# Patient Record
(demographics unavailable — no encounter records)

---

## 2024-10-16 NOTE — END OF VISIT
[FreeTextEntry3] :  I, FLORINA Wong , personally performed the evaluation and management (E/M) services for this established  patient. That E/M includes conducting the initial examination, assessing all conditions, and establishing the plan of care. Today, SANGITA GAN  was here to observe my evaluation and management services for this patient.

## 2024-10-16 NOTE — DISCUSSION/SUMMARY
[0] : 0 [Slow Progress] : is progressing slowly [Excellent Pain Control] : has excellent pain control [No Sign of Infection] : is showing no signs of infection [Coumadin] : the patient is not on Coumadin

## 2024-10-16 NOTE — ASSESSMENT
[FreeTextEntry1] : 61y F whom is Sinhala speaking primarily w/ a PMH of DM on oral medication now with CAD s/p C3L on 9/17/24  Post op skin yeast infection noted by wound care RN, started on antifungal cream, DC home on 5 days diuretics. Referred by Dr. Isadora Viveros, cath with Dr. Kalpesh Farnsworth.   Presents today with her son.  Doing well.  Saw Dr. Viveros   Gradually progressing since DC and walking more everyday.  Eating and drinking with +BM. Denies chest pain, SOB, swelling, weight gain, palpitations, cough, fever or chills. Rash much improved.  Sai Harrington (PCP)  Today on exam patient's lungs clear bilaterally, normal sinus rhythm, sternum stable, incision clean, dry and intact, small opening noted to lower pole of MSI  Left LE SVG site is clean, dry and intact. No peripheral edema noted. Instructed patient on importance of optimal glycemic control, daily showering, daily weights, incentive spirometer use, and increase ambulation as tolerated. Instructed to call office with any signs or symptoms of infection or weight gain of 2 or more pounds in 1 day or 3 or more pounds in 1 week.   Plan:  - Continue current medication regimen - Follow up with cardiologist Dr. Viveros - Follow up with PCP Sai Harrington - Continue to walk and increase as tolerated - Low salt diet and fluids discussed in detail - Tight glucose control discussed in detail for wound healing -  Advised to apply betadine to MSI daily and PRN  - Return to office in ONE weeks for recheck - Call with any questions or concerns

## 2024-10-23 NOTE — PHYSICAL EXAM
[] : no respiratory distress [Respiration, Rhythm And Depth] : normal respiratory rhythm and effort [Exaggerated Use Of Accessory Muscles For Inspiration] : no accessory muscle use [Auscultation Breath Sounds / Voice Sounds] : lungs were clear to auscultation bilaterally [Apical Impulse] : the apical impulse was normal [Heart Rate And Rhythm] : heart rate was normal and rhythm regular [Heart Sounds] : normal S1 and S2 [Clean] : clean [Dry] : dry [Healing Well] : healing well [No Edema] : no edema [FreeTextEntry1] : small opening noted to lower pole of incision  [Bleeding] : no active bleeding [Foul Odor] : no foul smell [Purulent Drainage] : no purulent drainage [Serosanguinous Drainage] : no serosanguinous drainage [Erythema] : not erythematous [Warm] : not warm [Tender] : not tender [FreeTextEntry5] : Left LE SVG

## 2024-10-23 NOTE — ASSESSMENT
[FreeTextEntry1] : 61y F whom is Icelandic speaking primarily w/ a PMH of DM on oral medication now with CAD s/p C3L on 9/17/24  Post op skin yeast infection noted by wound care RN, started on antifungal cream, DC home on 5 days diuretics. Referred by Dr. Isadora Viveros, cath with Dr. Kalpesh Farnsworth.   Presents today with her son.  Doing well.  Saw Dr. Viveros   Gradually progressing since DC and walking more everyday.  Eating and drinking with +BM. Denies chest pain, SOB, swelling, weight gain, palpitations, cough, fever or chills.   Sai Harrington (PCP)  Today on exam patient's lungs clear bilaterally, normal sinus rhythm, sternum stable, incision clean, dry and intact, small opening noted to lower pole of MSI  Left LE SVG site is clean, dry and intact. No peripheral edema noted. Instructed patient on importance of optimal glycemic control, daily showering, daily weights, incentive spirometer use, and increase ambulation as tolerated. Instructed to call office with any signs or symptoms of infection or weight gain of 2 or more pounds in 1 day or 3 or more pounds in 1 week.   Plan:  - Continue current medication regimen - Follow up with cardiologist Dr. Viveros - Follow up with PCP Sai Harrington - Continue to walk and increase as tolerated - Low salt diet and fluids discussed in detail - Tight glucose control discussed in detail for wound healing -  Advised to apply betadine to MSI daily and PRN  - Return to office in two weeks for recheck if needed  - Call with any questions or concerns

## 2024-10-23 NOTE — ASSESSMENT
[FreeTextEntry1] : 61y F whom is Arabic speaking primarily w/ a PMH of DM on oral medication now with CAD s/p C3L on 9/17/24  Post op skin yeast infection noted by wound care RN, started on antifungal cream, DC home on 5 days diuretics. Referred by Dr. Isadora Viveros, cath with Dr. Kalpesh Farnsworth.   Presents today with her son.  Doing well.  Saw Dr. Viveros   Gradually progressing since DC and walking more everyday.  Eating and drinking with +BM. Denies chest pain, SOB, swelling, weight gain, palpitations, cough, fever or chills.   Sai Harrington (PCP)  Today on exam patient's lungs clear bilaterally, normal sinus rhythm, sternum stable, incision clean, dry and intact, small opening noted to lower pole of MSI  Left LE SVG site is clean, dry and intact. No peripheral edema noted. Instructed patient on importance of optimal glycemic control, daily showering, daily weights, incentive spirometer use, and increase ambulation as tolerated. Instructed to call office with any signs or symptoms of infection or weight gain of 2 or more pounds in 1 day or 3 or more pounds in 1 week.   Plan:  - Continue current medication regimen - Follow up with cardiologist Dr. Viveros - Follow up with PCP Sai Harrington - Continue to walk and increase as tolerated - Low salt diet and fluids discussed in detail - Tight glucose control discussed in detail for wound healing -  Advised to apply betadine to MSI daily and PRN  - Return to office in two weeks for recheck if needed  - Call with any questions or concerns

## 2024-11-12 NOTE — CONSULT LETTER
[Dear  ___] : Dear  [unfilled], [Courtesy Letter:] : I had the pleasure of seeing your patient, [unfilled], in my office today. [Please see my note below.] : Please see my note below. [Consult Closing:] : Thank you very much for allowing me to participate in the care of this patient.  If you have any questions, please do not hesitate to contact me. [Sincerely,] : Sincerely, [FreeTextEntry3] : Ethan Farris MD [facility] Advanced Minimally Invasive, Robotic, General & Bariatric Surgery Chair, Dept of Surgery, [facility] Physician [address] [address] P: [phone] F: [phone]

## 2024-11-12 NOTE — CONSULT LETTER
[Dear  ___] : Dear  [unfilled], [Courtesy Letter:] : I had the pleasure of seeing your patient, [unfilled], in my office today. [Please see my note below.] : Please see my note below. [Consult Closing:] : Thank you very much for allowing me to participate in the care of this patient.  If you have any questions, please do not hesitate to contact me. [Sincerely,] : Sincerely, [FreeTextEntry3] : Ethan Farris MD [facility] Advanced Minimally Invasive, Robotic, General & Bariatric Surgery Chair, Dept of Surgery, [facility] Physician [address] [address] P: [phone] F: [phone]

## 2024-11-12 NOTE — PHYSICAL EXAM
[Normal Breath Sounds] : Normal breath sounds [Normal Heart Sounds] : normal heart sounds [Normal Rate and Rhythm] : normal rate and rhythm [No Rash or Lesion] : No rash or lesion [Alert] : alert [Oriented to Person] : oriented to person [Oriented to Place] : oriented to place [Oriented to Time] : oriented to time [Calm] : calm [de-identified] : Elderly appearing and obese woman in no acute distress [de-identified] : NCAT, PERRLA, EOMI.  No scleral icterus or jaundice [de-identified] : Well healed median sternotomy.  Scab at right sided precordial drain site no signs of infection [de-identified] : Soft, nontender nondistended, positive bowel sounds in all four quads.  No hernia or masses.  Surgical incisions remain well approximated and healing appropriately without erythema, induration or fluctuance. [de-identified] : Ambulating without difficulty or assistance

## 2024-11-12 NOTE — ASSESSMENT
[FreeTextEntry1] : s/p Lap Cholecystectomy. (recent CABG x3)  Uncomplicated post op course. Complains of loose stools.  Abdomen remains, Soft, nontender nondistended, positive bowel sounds in all four quads.  No hernia or masses.  Surgical incisions remain well approximated and healing appropriately without erythema, induration or fluctuance.  Path reviewed and discussed.  Acute cholecystitis and Cholelithiasis.  Benigns LN.  Multiple calculi ranging rom 0.1-0.5 cm aggregating at 4.5 x 4.5 x 0.5 present in lumen.  Postoperative instructions have been provided including avoidance of heavy lifting and strenuous activities in excess of 20-25 pounds for duration of 4-6 weeks. The patient may shower keeping the incisions clean, dry, covered as needed.  f/u PRN. No contraindication to resuming Cardiac Rehab given recent CABG.

## 2024-11-12 NOTE — HISTORY OF PRESENT ILLNESS
[de-identified] : Routine postoperative follow-up status post laparoscopic cholecystectomy.  Patient with recent coronary artery bypass graft

## 2024-11-12 NOTE — PHYSICAL EXAM
[Normal Breath Sounds] : Normal breath sounds [Normal Heart Sounds] : normal heart sounds [Normal Rate and Rhythm] : normal rate and rhythm [No Rash or Lesion] : No rash or lesion [Alert] : alert [Oriented to Person] : oriented to person [Oriented to Place] : oriented to place [Oriented to Time] : oriented to time [Calm] : calm [de-identified] : Elderly appearing and obese woman in no acute distress [de-identified] : NCAT, PERRLA, EOMI.  No scleral icterus or jaundice [de-identified] : Well healed median sternotomy.  Scab at right sided precordial drain site no signs of infection [de-identified] : Soft, nontender nondistended, positive bowel sounds in all four quads.  No hernia or masses.  Surgical incisions remain well approximated and healing appropriately without erythema, induration or fluctuance. [de-identified] : Ambulating without difficulty or assistance

## 2024-11-12 NOTE — HISTORY OF PRESENT ILLNESS
[de-identified] : Routine postoperative follow-up status post laparoscopic cholecystectomy.  Patient with recent coronary artery bypass graft

## 2025-05-28 NOTE — HISTORY OF PRESENT ILLNESS
[FreeTextEntry1] : 62F reports no recent chest pain, dyspnea, palpitations or fainting episode. TTE at outside office 4 weeks ago revealed EF 33%. She was recommended to undergo CRT-D implantation. She is here seeking a second opinion. She is compliant with meds and low salt diet.

## 2025-05-28 NOTE — REASON FOR VISIT
[Symptom and Test Evaluation] : symptom and test evaluation [Cardiac Failure] : cardiac failure [Hypertension] : hypertension [Coronary Artery Disease] : coronary artery disease [Family Member] : family member

## 2025-07-10 NOTE — HISTORY OF PRESENT ILLNESS
[FreeTextEntry1] : 62 year old reports no symptoms. She says she is able to climb stairs without experiencing dyspnea. Underwent LHC 6/20/25 - patent grafts.